# Patient Record
Sex: FEMALE | Race: WHITE | NOT HISPANIC OR LATINO | Employment: FULL TIME | URBAN - METROPOLITAN AREA
[De-identification: names, ages, dates, MRNs, and addresses within clinical notes are randomized per-mention and may not be internally consistent; named-entity substitution may affect disease eponyms.]

---

## 2017-03-20 ENCOUNTER — ALLSCRIPTS OFFICE VISIT (OUTPATIENT)
Dept: OTHER | Facility: OTHER | Age: 52
End: 2017-03-20

## 2017-03-20 DIAGNOSIS — F31.12 BIPOLAR DISORDER, CURRENT EPISODE MANIC WITHOUT PSYCHOTIC FEATURES, MODERATE (HCC): ICD-10-CM

## 2017-05-19 ENCOUNTER — ALLSCRIPTS OFFICE VISIT (OUTPATIENT)
Dept: OTHER | Facility: OTHER | Age: 52
End: 2017-05-19

## 2018-01-10 NOTE — PSYCH
Psych Med Mgmt    Appearance: was calm and cooperative, adequate hygiene and grooming and good eye contact  Observed mood: irritable  Observed mood: affect was constricted  Speech: a normal rate and fluent  Thought processes: coherent/organized  Hallucinations: no hallucinations present  Thought Content: no delusions  Abnormal Thoughts: The patient has no suicidal thoughts and no homicidal thoughts  Orientation: The patient is oriented to person, place and time, oriented to person, oriented to place and oriented to time  Recent and Remote Memory: short term memory intact and long term memory intact  Attention Span And Concentration: concentration intact  Insight: Limited insight  Judgment: Her judgment was limited  Muscle Strength And Tone  Muscle strength and tone were normal    The patient is experiencing no localized pain  Goals addressed in session: Medication Management       Treatment Recommendations: continue current treatment  Risks, Benefits And Possible Side Effects Of Medications: Risks, benefits, and possible side effects of medications explained to patient and patient verbalizes understanding  She reports normal appetite, normal energy level, no weight change and normal number of sleep hours  Patient stated Patience Baer helps her fall asleep but cannot stay asleep  She continue to take Seroquel 200 mg at bedtime but 30 tablets of 400 mg which only lasts her 2 months costs her 400 dollars  I switched the Rx to 400 mg twice daily to help with cost as it will last her 4 months until she meets her deductible  Patient stated she has been dx with Shannon  Vitals  Signs   Recorded: 35RXS4332 02:34PM   Height: 5 ft 10 in  Weight: 170 lb   BMI Calculated: 24 39  BSA Calculated: 1 95    Assessment    1  Bipolar I disorder, most recent episode manic, moderate (296 42) (F31 12)    Plan    1   Colcrys 0 6 MG Oral Tablet; TAKE 1 TABLET DAILY AS DIRECTED    2  QUEtiapine Fumarate 400 MG Oral Tablet; Take 1 tablet twice daily    Review of Systems    Constitutional: as noted in HPI  Substance Abuse Hx    Substance Abuse History: occasional ETOH  Active Problems    1  Acute gouty arthropathy (274 01) (M10 9)   2  Acute sinusitis (461 9) (J01 90)   3  Acute upper respiratory infection (465 9) (J06 9)   4  Acute upper respiratory infection (465 9) (J06 9)   5  ADHD (attention deficit hyperactivity disorder), combined type (314 01) (F90 2)   6  Allergic rhinitis (477 9) (J30 9)   7  Arthralgia of ankle or foot (719 47) (M25 579)   8  Backache (724 5) (M54 9)   9  Bipolar I disorder, most recent episode manic, moderate (296 42) (F31 12)   10  Bronchitis, asthmatic (493 90) (J45 909)   11  Chronic kidney disease (CKD), stage II (mild) (585 2) (N18 2)   12  Circadian rhythm sleep disorder, shift work type (327 36) (G47 26)   13  Cough (786 2) (R05)   14  Cough variant asthma (493 82) (J45 991)   15  Elbow injury (959 3) (S59 909A)   16  Lumbar disc disorder with myelopathy (722 73) (M51 06)   17  Paget's disease of bone (731 0) (M88 9)   18  Reactive airway disease (493 90) (J45 909)   19  Renal/ureteral disease (593 9) (N28 9)   20  Screening for cardiovascular condition (V81 2) (Z13 6)   21  Screening for diabetes mellitus (V77 1) (Z13 1)   22  Vaginal candidiasis (112 1) (B37 3)   23  Vitamin D deficiency (268 9) (E55 9)   24  Wheezing (786 07) (R06 2)    Past Medical History    1  History of Encounter for routine gynecological examination (V72 31) (Z01 419)   2  History of breast lump (V13 89) (Z87 898)   3  History of chronic kidney disease (V13 09) (Z87 448)   4  History of fatigue (V13 89) (Z87 898)   5  History of low back pain (V13 59) (Z87 39)   6  History of Meningitis (V12 42)   7  History of Other chronic pain (338 29) (G89 29)   8  History of Other chronic pain (338 29) (G89 29)   9   History of Screening for malignant neoplasm of cervix (V76 2) (Z12 4)    The active problems and past medical history were reviewed and updated today  Allergies    1  Penicillins   2  NSAIDs    Current Meds   1  QUEtiapine Fumarate 400 MG Oral Tablet; TAKE 1 TABLET AT BEDTIME  Requested for:   20Jun2016; Last Rx:20Jun2016 Ordered    The medication list was reviewed and updated today  Family Psych History  Family History    1  Family history of No Significant Family History    The family history was reviewed and updated today  Social History    · Denied: History of Alcohol Use (History)   · Never A Smoker   · Never Used Drugs  The social history was reviewed and updated today  The social history was reviewed and is unchanged  End of Encounter Meds    1  Colcrys 0 6 MG Oral Tablet; TAKE 1 TABLET DAILY AS DIRECTED; Therapy: 94UIK1349 to (Evaluate:09Nov2016); Last Rx:10Oct2016 Ordered    2  QUEtiapine Fumarate 400 MG Oral Tablet;  Take 1 tablet twice daily  Requested for:   44SFW3325; Last Rx:10Oct2016; Status: ACTIVE - Transmit to [facility]   Verification Ordered    Signatures   Electronically signed by : ANGELY Smith ; Oct 10 2016  2:35PM EST                       (Author)

## 2018-01-14 VITALS
SYSTOLIC BLOOD PRESSURE: 126 MMHG | DIASTOLIC BLOOD PRESSURE: 82 MMHG | HEART RATE: 76 BPM | WEIGHT: 158.38 LBS | BODY MASS INDEX: 22.67 KG/M2 | HEIGHT: 70 IN | TEMPERATURE: 98.7 F

## 2018-01-15 NOTE — PSYCH
Psych Med Mgmt    Appearance: was calm and cooperative, adequate hygiene and grooming and good eye contact  Observed mood: depressed, irritable and anxious  Observed mood: affect was constricted  Speech: a normal rate and fluent  Thought processes: coherent/organized  Hallucinations: no hallucinations present  Thought Content: no delusions  Abnormal Thoughts: The patient has no suicidal thoughts and no homicidal thoughts  Orientation: The patient is oriented to person, place and time, oriented to person, oriented to place and oriented to time  Recent and Remote Memory: short term memory intact and long term memory intact  Attention Span And Concentration: concentration impaired  Insight: Limited insight  Judgment: Her judgment was limited  Muscle Strength And Tone  Muscle strength and tone were normal    The patient is experiencing no localized pain  Goals addressed in session: Medication Management       Treatment Recommendations: Patient stated she is feeling irritable and has poor sleep she wants to try higher dose of Seroquel  Risks, Benefits And Possible Side Effects Of Medications: Risks, benefits, and possible side effects of medications explained to patient and patient verbalizes understanding  She reports normal appetite, normal energy level and decrease in number of sleep hours   due to irritability and poor sleep will increase dose of Seroquel  No health changes or new medications   No medication side effects  Vitals  Signs [Data Includes: Current Encounter]   Recorded: [id number] 03:54PM   Height: 5 ft 10 in  Weight: 172 lb   BMI Calculated: 24 68  BSA Calculated: 1 96    Assessment    1  ADHD (attention deficit hyperactivity disorder), combined type (314 01) (F90 2)   2  Bipolar I disorder, most recent episode manic, moderate (296 42) (F31 12)    Plan    1   QUEtiapine Fumarate 200 MG Oral Tablet; TAKE 1 TABLET AT BEDTIME    Review of Systems    Constitutional: No fever, no chills, feels well, no tiredness, no recent weight gain or loss  Cardiovascular: no complaints of slow or fast heart rate, no chest pain, no palpitations  Respiratory: no complaints of shortness of breath, no wheezing, no dyspnea on exertion  Gastrointestinal: no complaints of abdominal pain, no constipation, no nausea, no diarrhea, no vomiting  Genitourinary: no complaints of dysuria, no incontinence, no pelvic pain, no urinary frequency  Musculoskeletal: no complaints of arthralgia, no myalgias, no limb pain, no joint stiffness  Integumentary: no complaints of skin rash, no itching, no dry skin  Neurological: no complaints of headache, no confusion, no numbness, no dizziness  Substance Abuse Hx    Substance Abuse History: Denies  Active Problems    1  Acute gouty arthropathy (274 01) (M10 00)   2  Acute sinusitis (461 9) (J01 90)   3  Acute upper respiratory infection (465 9) (J06 9)   4  Acute upper respiratory infection (465 9) (J06 9)   5  ADHD (attention deficit hyperactivity disorder), combined type (314 01) (F90 2)   6  Allergic rhinitis (477 9) (J30 9)   7  Arthralgia of ankle or foot (719 47) (M25 579)   8  Backache (724 5) (M54 9)   9  Bipolar I disorder, most recent episode manic, moderate (296 42) (F31 12)   10  Bronchitis, asthmatic (493 90) (J45 909)   11  Chronic kidney disease (CKD), stage II (mild) (585 2) (N18 2)   12  Circadian rhythm sleep disorder, shift work type (327 36) (G47 26)   13  Cough (786 2) (R05)   14  Cough variant asthma (493 82) (J45 991)   15  Elbow injury (959 3) (S59 909A)   16  Lumbar disc disorder with myelopathy (722 73) (M51 06)   17  Paget's disease of bone (731 0) (M88 9)   18  Reactive airway disease (493 90) (J45 909)   19  Renal/ureteral disease (593 9) (N28 9)   20  Screening for cardiovascular condition (V81 2) (Z13 6)   21  Screening for diabetes mellitus (V77 1) (Z13 1)   22   Vaginal candidiasis (112  1) (B37 3)   23  Vitamin D deficiency (268 9) (E55 9)   24  Wheezing (786 07) (R06 2)    Past Medical History    1  History of Encounter for routine gynecological examination (V72 31) (Z01 419)   2  History of breast lump (V13 89) (Z87 898)   3  History of chronic kidney disease (V13 09) (Z87 448)   4  History of fatigue (V13 89) (Z87 898)   5  History of low back pain (V13 59) (Z87 39)   6  History of Meningitis (V12 42)   7  History of Other chronic pain (338 29) (G89 29)   8  History of Other chronic pain (338 29) (G89 29)   9  History of Screening for malignant neoplasm of cervix (V76 2) (Z12 4)    The active problems and past medical history were reviewed and updated today  Allergies    1  Penicillins   2  NSAIDs    Current Meds   1  QUEtiapine Fumarate 300 MG Oral Tablet; TAKE 0 5 TABLET Once At Bedtime    Requested for: 15Oct2015; Last Rx:15Oct2015 Ordered    The medication list was reviewed and updated today  Family Psych History    1  Family history of No Significant Family History    The family history was reviewed and updated today  Social History    · Denied: History of Alcohol Use (History)   · Never A Smoker   · Never Used Drugs  The social history was reviewed and updated today  The social history was reviewed and is unchanged  End of Encounter Meds    1   QUEtiapine Fumarate 200 MG Oral Tablet; TAKE 1 TABLET AT BEDTIME  Requested for:   28XMX9881; Last Rx:70Lgp1076 Ordered    Signatures   Electronically signed by : ANGELY Kelly ; Feb 9 2016  3:58PM EST                       (Author)

## 2018-01-16 NOTE — PSYCH
Psych Med Mgmt    Appearance: was calm and cooperative and good eye contact  Observed mood: mood appropriate  Observed mood: affect appropriate  Speech: a normal rate and fluent  Thought processes: coherent/organized  Hallucinations: no hallucinations present  Thought Content: no delusions  Abnormal Thoughts: The patient has no suicidal thoughts and no homicidal thoughts  Orientation: The patient is oriented to person, place and time, oriented to person, oriented to place and oriented to time  Recent and Remote Memory: short term memory intact and long term memory intact  Attention Span And Concentration: concentration impaired  Insight: Limited insight  Judgment: Her judgment was limited  Muscle Strength And Tone  Muscle strength and tone were normal         Goals addressed in session: Medication Management       Treatment Recommendations: Increase Seroquel to 400 mg for insomnia  Trial Belsomra 20 mg 1/2 tab to 1 tab po qhs  Risks, Benefits And Possible Side Effects Of Medications: Risks, benefits, and possible side effects of medications explained to patient and patient verbalizes understanding  She reports normal appetite, normal energy level, no weight change and normal number of sleep hours  Patient continues to have irritability and difficulties falling asleep   Sleep consultation referral offered but patient declined  Assessment    1  Bipolar I disorder, most recent episode manic, moderate (296 42) (F31 12)    Plan    1  QUEtiapine Fumarate 400 MG Oral Tablet; TAKE 1 TABLET AT BEDTIME    2  Belsomra 20 MG Oral Tablet; TAKE 1 TABLET Bedtime PRN insomnia    Review of Systems    Constitutional: as noted in HPI  Substance Abuse Hx    Substance Abuse History: Denies  Active Problems    1  Acute gouty arthropathy (274 01) (M10 00)   2  Acute sinusitis (461 9) (J01 90)   3  Acute upper respiratory infection (465 9) (J06 9)   4   Acute upper respiratory infection (465 9) (J06 9)   5  ADHD (attention deficit hyperactivity disorder), combined type (314 01) (F90 2)   6  Allergic rhinitis (477 9) (J30 9)   7  Arthralgia of ankle or foot (719 47) (M25 579)   8  Backache (724 5) (M54 9)   9  Bipolar I disorder, most recent episode manic, moderate (296 42) (F31 12)   10  Bronchitis, asthmatic (493 90) (J45 909)   11  Chronic kidney disease (CKD), stage II (mild) (585 2) (N18 2)   12  Circadian rhythm sleep disorder, shift work type (327 36) (G47 26)   13  Cough (786 2) (R05)   14  Cough variant asthma (493 82) (J45 991)   15  Elbow injury (959 3) (S59 909A)   16  Lumbar disc disorder with myelopathy (722 73) (M51 06)   17  Paget's disease of bone (731 0) (M88 9)   18  Reactive airway disease (493 90) (J45 909)   19  Renal/ureteral disease (593 9) (N28 9)   20  Screening for cardiovascular condition (V81 2) (Z13 6)   21  Screening for diabetes mellitus (V77 1) (Z13 1)   22  Vaginal candidiasis (112 1) (B37 3)   23  Vitamin D deficiency (268 9) (E55 9)   24  Wheezing (786 07) (R06 2)    Past Medical History    1  History of Encounter for routine gynecological examination (V72 31) (Z01 419)   2  History of breast lump (V13 89) (Z87 898)   3  History of chronic kidney disease (V13 09) (Z87 448)   4  History of fatigue (V13 89) (Z87 898)   5  History of low back pain (V13 59) (Z87 39)   6  History of Meningitis (V12 42)   7  History of Other chronic pain (338 29) (G89 29)   8  History of Other chronic pain (338 29) (G89 29)   9  History of Screening for malignant neoplasm of cervix (V76 2) (Z12 4)    The active problems and past medical history were reviewed and updated today  Allergies    1  Penicillins   2  NSAIDs    Current Meds   1  QUEtiapine Fumarate 200 MG Oral Tablet; TAKE 1 TABLET AT BEDTIME  Requested for:   80LUL0606; Last Rx:10Nvf7695 Ordered    The medication list was reviewed and updated today  Family Psych History  Family History    1   Family history of No Significant Family History    The family history was reviewed and updated today  Social History    · Denied: History of Alcohol Use (History)   · Never A Smoker   · Never Used Drugs  The social history was reviewed and updated today  End of Encounter Meds    1  QUEtiapine Fumarate 400 MG Oral Tablet; TAKE 1 TABLET AT BEDTIME  Requested for:   20Jun2016; Last Rx:20Jun2016 Ordered    2  Belsomra 20 MG Oral Tablet; TAKE 1 TABLET Bedtime PRN insomnia; Therapy: 20Jun2016 to (Evaluate:30Jun2016);  Last Rx:20Jun2016 Ordered    Signatures   Electronically signed by : ANGELY Venegas ; Jun 20 2016  2:58PM EST                       (Author)

## 2018-01-17 NOTE — PSYCH
Psych Med Mgmt    Appearance: was calm and cooperative, adequate hygiene and grooming and good eye contact  Observed mood: mood appropriate  Observed mood: affect appropriate  Speech: a normal rate and fluent  Thought processes: coherent/organized  Hallucinations: no hallucinations present  Thought Content: no delusions  Abnormal Thoughts: The patient has no suicidal thoughts and no homicidal thoughts  Orientation: The patient is oriented to person, place and time, oriented to person, oriented to place and oriented to time  Recent and Remote Memory: short term memory intact and long term memory intact  Attention Span And Concentration: concentration intact  Insight: Limited insight  Judgment: Her judgment was limited  Muscle Strength And Tone  Muscle strength and tone were normal       Goals addressed in session: Medication Management     Treatment Recommendations: Continue Seroquel 200 mg qhs   Patient stated insurance covers now  Risks, Benefits And Possible Side Effects Of Medications: Risks, benefits, and possible side effects of medications explained to patient and patient verbalizes understanding  She reports normal appetite, normal energy level, no weight change and normal number of sleep hours  Mood has been stable   No medication side effects  No recent health changes  Dog bite tx at urgent ctr with IM Tramadol may have cause creatinine elevation 2 0  Repeat CMP  Assessment    1  Bipolar I disorder, most recent episode manic, moderate (296 42) (F31 12)    Plan    1  QUEtiapine Fumarate 200 MG Oral Tablet; TAKE 1 TABLET AT BEDTIME   2  (1) COMPREHENSIVE METABOLIC PANEL; Status:Active; Requested for:20Mar2017;     Review of Systems    Constitutional: as noted in HPI  Substance Abuse Hx    Substance Abuse History: Deneis  Active Problems    1  Acute gouty arthropathy (274 01) (M10 9)   2  Acute sinusitis (461 9) (J01 90)   3   ADHD (attention deficit hyperactivity disorder), combined type (314 01) (F90 2)   4  Allergic rhinitis (477 9) (J30 9)   5  Backache (724 5) (M54 9)   6  Bipolar I disorder, most recent episode manic, moderate (296 42) (F31 12)   7  Chronic gout of foot due to renal impairment, unspecified laterality (274 19) (M1A 3790)   8  Chronic kidney disease (CKD), stage II (mild) (585 2) (N18 2)   9  Circadian rhythm sleep disorder, shift work type (327 36) (G47 26)   10  Cough variant asthma (493 82) (J45 991)   11  Elbow injury (959 3) (S59 909A)   12  Gout (274 9) (M10 9)   13  Lumbar disc disorder with myelopathy (722 73) (M51 06)   14  Paget's disease of bone (731 0) (M88 9)   15  Renal/ureteral disease (593 9) (N28 9)   16  Screening for cardiovascular condition (V81 2) (Z13 6)   17  Vaginal candidiasis (112 1) (B37 3)   18  Vitamin D deficiency (268 9) (E55 9)    Past Medical History    1  History of Encounter for routine gynecological examination (V72 31) (Z01 419)   2  History of breast lump (V13 89) (Z87 898)   3  History of chronic kidney disease (V13 09) (Z87 448)   4  History of fatigue (V13 89) (Z87 898)   5  History of low back pain (V13 59) (Z87 39)   6  History of Meningitis (V12 42)   7  History of Screening for malignant neoplasm of cervix (V76 2) (Z12 4)    The active problems and past medical history were reviewed and updated today  Allergies    1  Penicillins   2  NSAIDs    Current Meds   1  Allopurinol 100 MG Oral Tablet; Take 1 tablet daily; Therapy: 13BFR1578 to (Evaluate:45Bwh4834)  Requested for: 27JKB5646; Last   Rx:55Irv4882 Ordered   2  Colcrys 0 6 MG Oral Tablet; TAKE 1 TABLET DAILY AS DIRECTED; Therapy: 80FGX7489 to (Evaluate:07Bes7834)  Requested for: 67AYR1825; Last   Rx:11Jan2017 Ordered   3  QUEtiapine Fumarate 400 MG Oral Tablet; Take 1 tablet twice daily;    Therapy: 17LBP7245 to (Wallace Serve)  Requested for: 21WKM3467; Last   Rx:17Mar2017 Ordered    The medication list was reviewed and updated today  Family Psych History  Mother    1  Family history of No Significant Family History  Father    2  Family history of No Significant Family History  Family History    3  Family history of No Significant Family History    The family history was reviewed and updated today  Social History    · Denied: History of Alcohol Use (History)   · Never A Smoker   · Never Used Drugs  The social history was reviewed and updated today  The social history was reviewed and is unchanged  End of Encounter Meds    1  Colcrys 0 6 MG Oral Tablet (Colchicine); TAKE 1 TABLET DAILY AS DIRECTED; Therapy: 56RLH0441 to (Evaluate:53Ljm1097)  Requested for: 47NGS3484; Last   Rx:11Jan2017 Ordered    2  QUEtiapine Fumarate 200 MG Oral Tablet; TAKE 1 TABLET AT BEDTIME; Therapy: 31AUB6217 to (Evaluate:32Eqg4735)  Requested for: 20Mar2017; Last   Rx:20Mar2017 Ordered    3  Allopurinol 100 MG Oral Tablet; Take 1 tablet daily;    Therapy: 70UEK9443 to (Evaluate:80Giz6820)  Requested for: 87RXX8669; Last   Rx:04Dsd6701 Ordered    Signatures   Electronically signed by : ANGELY Cortez ; Mar 20 2017  2:37PM EST                       (Author)

## 2018-04-26 ENCOUNTER — DOCUMENTATION (OUTPATIENT)
Dept: PSYCHIATRY | Facility: CLINIC | Age: 53
End: 2018-04-26

## 2018-04-26 NOTE — PROGRESS NOTES
Treatment Plan not completed within required time limits due to: no show appointment on 4/23/2018    Last seen 3/20/17 and was Bumped on 9/19/17

## 2019-01-02 ENCOUNTER — OFFICE VISIT (OUTPATIENT)
Dept: INTERNAL MEDICINE CLINIC | Age: 54
End: 2019-01-02
Payer: COMMERCIAL

## 2019-01-02 VITALS
SYSTOLIC BLOOD PRESSURE: 110 MMHG | HEART RATE: 64 BPM | TEMPERATURE: 98.2 F | DIASTOLIC BLOOD PRESSURE: 70 MMHG | OXYGEN SATURATION: 99 % | WEIGHT: 160.4 LBS | HEIGHT: 70 IN | BODY MASS INDEX: 22.96 KG/M2

## 2019-01-02 DIAGNOSIS — F31.12 BIPOLAR I DISORDER, MOST RECENT EPISODE MANIC, MODERATE (HCC): ICD-10-CM

## 2019-01-02 DIAGNOSIS — Z12.31 SCREENING MAMMOGRAM, ENCOUNTER FOR: ICD-10-CM

## 2019-01-02 DIAGNOSIS — R59.1 LYMPHADENOPATHY: ICD-10-CM

## 2019-01-02 DIAGNOSIS — Z13.220 SCREENING, LIPID: ICD-10-CM

## 2019-01-02 DIAGNOSIS — D24.2 BREAST ADENOMA, LEFT: Primary | ICD-10-CM

## 2019-01-02 DIAGNOSIS — N18.30 CHRONIC RENAL DISEASE, STAGE 3, MODERATELY DECREASED GLOMERULAR FILTRATION RATE (GFR) BETWEEN 30-59 ML/MIN/1.73 SQUARE METER (HCC): ICD-10-CM

## 2019-01-02 PROCEDURE — 3008F BODY MASS INDEX DOCD: CPT | Performed by: INTERNAL MEDICINE

## 2019-01-02 PROCEDURE — 99214 OFFICE O/P EST MOD 30 MIN: CPT | Performed by: INTERNAL MEDICINE

## 2019-01-02 RX ORDER — QUETIAPINE FUMARATE 100 MG/1
100 TABLET, FILM COATED ORAL
Qty: 30 TABLET | Refills: 6 | Status: SHIPPED | OUTPATIENT
Start: 2019-01-02

## 2019-01-02 NOTE — ASSESSMENT & PLAN NOTE
She does have a long history of bipolar disorder and is on Seroquel 100 H else chronically    She currently does not have a psychiatrist so will reorder

## 2019-01-02 NOTE — PROGRESS NOTES
Assessment/Plan:    Breast adenoma, left  Patient is actually here for a with mammogram   She has a known on fibroadenoma has been followed for years but is due  It does get tender at times  She is postmenopausal    Lymphadenopathy  Family members feel she has a goiter however exam only shows mild lymphadenopathy submandibular and a prominent Fernando's apple  Will check an ultrasound of her neck    Chronic renal disease, stage 3, moderately decreased glomerular filtration rate (GFR) between 30-59 mL/min/1 73 square meter ([address] )  Patient has chronic renal disease for years due to lithium use in the past   Her last creatinine was 2 0 in 2016  She needs to have it recheck    Bipolar I disorder, most recent episode manic, moderate ([address] )  She does have a long history of bipolar disorder and is on Seroquel 100 H else chronically  She currently does not have a psychiatrist so will reorder       Diagnoses and all orders for this visit:    Breast adenoma, left  -     Mammo diagnostic bilateral w cad; Future    Lymphadenopathy  -     Comprehensive metabolic panel; Future  -     CBC and differential; Future  -     TSH baseline; Future    Screening mammogram, encounter for    Screening, lipid  -     Lipid panel; Future    Chronic renal disease, stage 3, moderately decreased glomerular filtration rate (GFR) between 30-59 mL/min/1 73 square meter (HCC)    Bipolar I disorder, most recent episode manic, moderate (HCC)  -     QUEtiapine (SEROquel) 100 mg tablet; Take 1 tablet (100 mg total) by mouth daily at bedtime          Subjective:      Patient ID: Mekhi Hickman is a 52 y o  female  Patient feels well but needs a checkup in a repeat mammogram             Review of Systems   Constitutional: Negative for chills, fatigue, fever and unexpected weight change  HENT: Negative for congestion, ear pain, hearing loss, postnasal drip, sinus pressure, sore throat, trouble swallowing and voice change      Eyes: Negative for visual disturbance  Respiratory: Negative for cough, chest tightness, shortness of breath and wheezing  Cardiovascular: Negative for chest pain, palpitations and leg swelling  Gastrointestinal: Negative for abdominal distention, abdominal pain, anal bleeding, blood in stool, constipation, diarrhea and nausea  Endocrine: Negative for cold intolerance, polydipsia, polyphagia and polyuria  Genitourinary: Negative for dysuria, flank pain, frequency, hematuria and urgency  Longstanding left breast lump   Musculoskeletal: Negative for arthralgias, back pain, gait problem, joint swelling, myalgias and neck pain  Skin: Negative for rash  Allergic/Immunologic: Negative for immunocompromised state  Neurological: Negative for dizziness, syncope, facial asymmetry, weakness, light-headedness, numbness and headaches  Hematological: Negative for adenopathy  Psychiatric/Behavioral: Negative for confusion, sleep disturbance and suicidal ideas  Objective:      /70 (BP Location: Left arm, Patient Position: Sitting)   Pulse 64   Temp 98 2 °F (36 8 °C) (Tympanic)   Ht 5' 10" (1 778 m)   Wt 72 8 kg (160 lb 6 4 oz)   SpO2 99%   BMI 23 02 kg/m²          Physical Exam   Constitutional: She is oriented to person, place, and time  She appears well-developed and well-nourished  No distress  HENT:   Right Ear: External ear normal    Left Ear: External ear normal    Nose: Nose normal    Mouth/Throat: Oropharynx is clear and moist  No oropharyngeal exudate  Eyes: Pupils are equal, round, and reactive to light  EOM are normal    Neck: Normal range of motion  Neck supple  No JVD present  No thyromegaly present  Prominent Fernando's apple   Cardiovascular: Normal rate, regular rhythm, normal heart sounds and intact distal pulses  Exam reveals no gallop  No murmur heard  Pulmonary/Chest: Effort normal and breath sounds normal  No respiratory distress  She has no wheezes  She has no rales     Abdominal: Soft  Bowel sounds are normal  She exhibits no distension and no mass  There is no tenderness  Genitourinary:   Genitourinary Comments: As a small fibroadenoma left breast right upper quadrant   Musculoskeletal: Normal range of motion  She exhibits no tenderness  Lymphadenopathy:     She has cervical adenopathy (Small submandibular lymph nodes)  Neurological: She is alert and oriented to person, place, and time  No cranial nerve deficit  Coordination normal    Skin: No rash noted  Psychiatric: She has a normal mood and affect  Her behavior is normal  Judgment and thought content normal    Vitals reviewed

## 2019-01-02 NOTE — ASSESSMENT & PLAN NOTE
Family members feel she has a goiter however exam only shows mild lymphadenopathy submandibular and a prominent Fernando's apple    Will check an ultrasound of her neck

## 2019-01-02 NOTE — PATIENT INSTRUCTIONS
Fibrocystic Breast Changes   AMBULATORY CARE:   Fibrocystic changes  are changes in your breast tissue  Your breast tissue may have small cysts, benign lumps (not cancer), or thickened areas  These breast tissue changes are common in women who have not gone through menopause  Fibrocystic breast changes do not increase your risk of breast cancer  The cause of fibrocystic breast changes is unknown  They may be related to hormonal changes that occur during your menstrual cycle  Common signs and symptoms of fibrocystic breast changes:  Signs and symptoms may be more noticeable before your period  You may have any of the following:  · Tenderness and pain in the upper outer areas of both breasts    · Cysts that get larger and more painful before your period    · Breast swelling during your period    · Clear or cloudy nipple discharge  Contact your healthcare provider if:   · You notice other changes in your breasts or nipples, such as dimpling or a rash  · You have bloody nipple discharge  · You have a fever  · You have questions or concerns about your condition or care  Treatment:  Your healthcare provider may recommend the following to relieve your symptoms:  · NSAIDs , such as ibuprofen, help decrease swelling, pain, and fever  This medicine is available with or without a doctor's order  NSAIDs can cause stomach bleeding or kidney problems in certain people  If you take blood thinner medicine, always ask your healthcare provider if NSAIDs are safe for you  Always read the medicine label and follow directions  · Oral contraceptives  (birth control pills) may be recommended by your healthcare provider  These may help to decrease the level of hormones that worsen your signs and symptoms  · Surgery  to remove a large lump or cysts that return after drainage may be done  Manage your symptoms:   · Apply heat  on your breasts for 20 to 30 minutes every 2 hours for as many days as directed   Heat helps decrease pain and muscle spasms  · Apply ice  on your breasts for 15 to 20 minutes every hour or as directed  Use an ice pack, or put crushed ice in a plastic bag  Cover it with a towel  Ice helps prevent tissue damage and decreases swelling and pain  · Wear a well-fitted, supportive bra  It may help to relieve pain and swelling  · Avoid or limit caffeine  This may help to decrease symptoms in some women  Caffeine is found in coffee, tea, sodas, and chocolate  Continue breast self-exams:  Check your breast for changes in size, shape, or feel of the breast tissue  Check under your arms and all around your breasts  If you have monthly periods, examine your breasts after your period is over  Contact your healthcare provider if you notice any changes in your breasts  If you have questions, ask for more information about how to do a breast self-exam         Follow up with your healthcare provider as directed:  Write down your questions so you remember to ask them during your visits  © 2017 [facility] Information is for End User's use only and may not be sold, redistributed or otherwise used for commercial purposes  All illustrations and images included in CareNotes® are the copyrighted property of [employer]  or Landon Alv[employer]  The above information is an  only  It is not intended as medical advice for individual conditions or treatments  Talk to your doctor, nurse or pharmacist before following any medical regimen to see if it is safe and effective for you

## 2019-01-02 NOTE — ASSESSMENT & PLAN NOTE
Patient is actually here for a with mammogram   She has a known on fibroadenoma has been followed for years but is due  It does get tender at times    She is postmenopausal

## 2019-01-02 NOTE — ASSESSMENT & PLAN NOTE
Patient has chronic renal disease for years due to lithium use in the past   Her last creatinine was 2 0 in 2016    She needs to have it recheck

## 2019-01-05 LAB
ALBUMIN SERPL-MCNC: 4.4 G/DL (ref 3.6–5.1)
ALBUMIN/GLOB SERPL: 1.9 (CALC) (ref 1–2.5)
ALP SERPL-CCNC: 96 U/L (ref 33–130)
ALT SERPL-CCNC: 13 U/L (ref 6–29)
AST SERPL-CCNC: 16 U/L (ref 10–35)
BASOPHILS # BLD AUTO: 70 CELLS/UL (ref 0–200)
BASOPHILS NFR BLD AUTO: 1.4 %
BILIRUB SERPL-MCNC: 0.6 MG/DL (ref 0.2–1.2)
BUN SERPL-MCNC: 24 MG/DL (ref 7–25)
BUN/CREAT SERPL: 13 (CALC) (ref 6–22)
CALCIUM SERPL-MCNC: 10.3 MG/DL (ref 8.6–10.4)
CHLORIDE SERPL-SCNC: 113 MMOL/L (ref 98–110)
CHOLEST SERPL-MCNC: 231 MG/DL
CHOLEST/HDLC SERPL: 3.5 (CALC)
CO2 SERPL-SCNC: 30 MMOL/L (ref 20–32)
CREAT SERPL-MCNC: 1.79 MG/DL (ref 0.5–1.05)
EOSINOPHIL # BLD AUTO: 340 CELLS/UL (ref 15–500)
EOSINOPHIL NFR BLD AUTO: 6.8 %
ERYTHROCYTE [DISTWIDTH] IN BLOOD BY AUTOMATED COUNT: 11.8 % (ref 11–15)
GLOBULIN SER CALC-MCNC: 2.3 G/DL (CALC) (ref 1.9–3.7)
GLUCOSE SERPL-MCNC: 95 MG/DL (ref 65–99)
HCT VFR BLD AUTO: 40.9 % (ref 35–45)
HDLC SERPL-MCNC: 66 MG/DL
HGB BLD-MCNC: 14.2 G/DL (ref 11.7–15.5)
LDLC SERPL CALC-MCNC: 141 MG/DL (CALC)
LYMPHOCYTES # BLD AUTO: 1685 CELLS/UL (ref 850–3900)
LYMPHOCYTES NFR BLD AUTO: 33.7 %
MCH RBC QN AUTO: 32.3 PG (ref 27–33)
MCHC RBC AUTO-ENTMCNC: 34.7 G/DL (ref 32–36)
MCV RBC AUTO: 93 FL (ref 80–100)
MONOCYTES # BLD AUTO: 400 CELLS/UL (ref 200–950)
MONOCYTES NFR BLD AUTO: 8 %
NEUTROPHILS # BLD AUTO: 2505 CELLS/UL (ref 1500–7800)
NEUTROPHILS NFR BLD AUTO: 50.1 %
NONHDLC SERPL-MCNC: 165 MG/DL (CALC)
PLATELET # BLD AUTO: 241 THOUSAND/UL (ref 140–400)
PMV BLD REES-ECKER: 9.8 FL (ref 7.5–12.5)
POTASSIUM SERPL-SCNC: 4.2 MMOL/L (ref 3.5–5.3)
PROT SERPL-MCNC: 6.7 G/DL (ref 6.1–8.1)
RBC # BLD AUTO: 4.4 MILLION/UL (ref 3.8–5.1)
SL AMB EGFR AFRICAN AMERICAN: 36 ML/MIN/1.73M2
SL AMB EGFR NON AFRICAN AMERICAN: 31 ML/MIN/1.73M2
SODIUM SERPL-SCNC: 147 MMOL/L (ref 135–146)
TRIGL SERPL-MCNC: 121 MG/DL
TSH SERPL-ACNC: 2.09 MIU/L
WBC # BLD AUTO: 5 THOUSAND/UL (ref 3.8–10.8)

## 2019-01-08 ENCOUNTER — HOSPITAL ENCOUNTER (OUTPATIENT)
Dept: MAMMOGRAPHY | Facility: CLINIC | Age: 54
Discharge: HOME | End: 2019-01-08

## 2019-01-11 ENCOUNTER — HOSPITAL ENCOUNTER (OUTPATIENT)
Dept: ULTRASOUND IMAGING | Facility: CLINIC | Age: 54
Discharge: HOME | End: 2019-01-11
Payer: COMMERCIAL

## 2019-01-11 ENCOUNTER — HOSPITAL ENCOUNTER (OUTPATIENT)
Dept: MAMMOGRAPHY | Facility: CLINIC | Age: 54
Discharge: HOME | End: 2019-01-11
Payer: COMMERCIAL

## 2019-01-11 VITALS — HEIGHT: 70 IN | BODY MASS INDEX: 22.9 KG/M2 | WEIGHT: 160 LBS

## 2019-01-11 DIAGNOSIS — N63.0 LUMP OF BREAST: ICD-10-CM

## 2019-01-11 DIAGNOSIS — D24.2 BREAST ADENOMA, LEFT: ICD-10-CM

## 2019-01-11 PROCEDURE — G0279 TOMOSYNTHESIS, MAMMO: HCPCS

## 2019-01-11 PROCEDURE — 76642 ULTRASOUND BREAST LIMITED: CPT

## 2019-01-11 PROCEDURE — 77066 DX MAMMO INCL CAD BI: CPT

## 2019-02-13 ENCOUNTER — RX ONLY (RX ONLY)
Age: 54
End: 2019-02-13

## 2019-02-13 ENCOUNTER — DOCTOR'S OFFICE (OUTPATIENT)
Dept: URBAN - METROPOLITAN AREA CLINIC 137 | Facility: CLINIC | Age: 54
Setting detail: OPHTHALMOLOGY
End: 2019-02-13
Payer: COMMERCIAL

## 2019-02-13 DIAGNOSIS — H52.4: ICD-10-CM

## 2019-02-13 PROCEDURE — 92015 DETERMINE REFRACTIVE STATE: CPT | Performed by: OPTOMETRIST

## 2019-02-13 PROCEDURE — 92004 COMPRE OPH EXAM NEW PT 1/>: CPT | Performed by: OPTOMETRIST

## 2019-02-13 ASSESSMENT — REFRACTION_CURRENTRX
OD_OVR_VA: 20/
OS_OVR_VA: 20/
OD_OVR_VA: 20/
OD_OVR_VA: 20/

## 2019-02-13 ASSESSMENT — REFRACTION_AUTOREFRACTION
OS_AXIS: 107
OS_SPHERE: -0.25
OD_CYLINDER: -0.25
OS_CYLINDER: -0.75
OD_AXIS: 29
OD_SPHERE: +0.75

## 2019-02-13 ASSESSMENT — REFRACTION_MANIFEST
OS_ADD: +2.00
OU_VA: 20/
OD_VA3: 20/
OS_AXIS: 105
OS_VA3: 20/
OD_SPHERE: +0.50
OD_VA1: 20/
OS_SPHERE: -0.25
OS_VA1: 20/20
OD_VA2: 20/
OS_VA2: 20/
OU_VA: 20/20
OD_VA2: 20/
OS_CYLINDER: -0.50
OS_VA3: 20/
OS_VA2: 20/
OD_VA1: 20/20
OD_VA3: 20/
OD_CYLINDER: SPH
OD_ADD: +2.00
OS_VA1: 20/

## 2019-02-13 ASSESSMENT — CONFRONTATIONAL VISUAL FIELD TEST (CVF)
OD_FINDINGS: FULL
OS_FINDINGS: FULL

## 2019-02-13 ASSESSMENT — SPHEQUIV_DERIVED
OS_SPHEQUIV: -0.5
OS_SPHEQUIV: -0.625
OD_SPHEQUIV: 0.625

## 2019-02-13 ASSESSMENT — VISUAL ACUITY
OD_BCVA: 20/25
OS_BCVA: 20/25

## 2020-02-18 ENCOUNTER — DOCTOR'S OFFICE (OUTPATIENT)
Dept: URBAN - METROPOLITAN AREA CLINIC 137 | Facility: CLINIC | Age: 55
Setting detail: OPHTHALMOLOGY
End: 2020-02-18

## 2020-02-18 DIAGNOSIS — H52.4: ICD-10-CM

## 2020-02-18 PROCEDURE — SELFPAYVIS VISION VISIT SELF PAY: Performed by: OPTOMETRIST

## 2020-02-18 ASSESSMENT — REFRACTION_MANIFEST
OS_ADD: +2.00
OS_ADD: +2.00
OS_AXIS: 105
OS_SPHERE: +0.25
OD_SPHERE: +0.50
OD_AXIS: 10
OD_VA1: 20/20
OD_ADD: +2.00
OS_VA1: 20/20
OS_AXIS: 155
OD_CYLINDER: SPH
OD_SPHERE: +1.00
OD_CYLINDER: -0.75
OD_ADD: +2.00
OU_VA: 20/20
OS_SPHERE: -0.25
OS_CYLINDER: -0.75
OS_VA1: 20/20
OS_CYLINDER: -0.50
OD_VA1: 20/20

## 2020-02-18 ASSESSMENT — KERATOMETRY
OD_AXISANGLE_DEGREES: 174
OD_K1POWER_DIOPTERS: 41.00
OS_AXISANGLE_DEGREES: 167
OD_K2POWER_DIOPTERS: 43.00
OS_K1POWER_DIOPTERS: 41.50
OS_K2POWER_DIOPTERS: 42.50

## 2020-02-18 ASSESSMENT — REFRACTION_AUTOREFRACTION
OS_AXIS: 145
OS_SPHERE: PLANO
OD_SPHERE: +1.00
OD_CYLINDER: -0.50
OS_CYLINDER: -0.50
OD_AXIS: 003

## 2020-02-18 ASSESSMENT — VISUAL ACUITY
OS_BCVA: 20/20-2
OD_BCVA: 20/20-2

## 2020-02-18 ASSESSMENT — CONFRONTATIONAL VISUAL FIELD TEST (CVF)
OD_FINDINGS: FULL
OS_FINDINGS: FULL

## 2020-02-18 ASSESSMENT — SPHEQUIV_DERIVED
OS_SPHEQUIV: -0.125
OS_SPHEQUIV: -0.5
OD_SPHEQUIV: 0.625
OD_SPHEQUIV: 0.75

## 2020-02-18 ASSESSMENT — AXIALLENGTH_DERIVED
OD_AL: 23.9034
OS_AL: 24.362
OS_AL: 24.2072
OD_AL: 23.8535

## 2021-01-08 ENCOUNTER — IMMUNIZATIONS (OUTPATIENT)
Dept: FAMILY MEDICINE CLINIC | Facility: HOSPITAL | Age: 56
End: 2021-01-08

## 2021-01-08 DIAGNOSIS — Z23 ENCOUNTER FOR IMMUNIZATION: ICD-10-CM

## 2021-01-08 PROCEDURE — 91300 SARS-COV-2 / COVID-19 MRNA VACCINE (PFIZER-BIONTECH) 30 MCG: CPT

## 2021-01-08 PROCEDURE — 0001A SARS-COV-2 / COVID-19 MRNA VACCINE (PFIZER-BIONTECH) 30 MCG: CPT

## 2021-01-27 ENCOUNTER — IMMUNIZATIONS (OUTPATIENT)
Dept: FAMILY MEDICINE CLINIC | Facility: HOSPITAL | Age: 56
End: 2021-01-27

## 2021-01-27 DIAGNOSIS — Z23 ENCOUNTER FOR IMMUNIZATION: Primary | ICD-10-CM

## 2021-01-27 PROCEDURE — 91300 SARS-COV-2 / COVID-19 MRNA VACCINE (PFIZER-BIONTECH) 30 MCG: CPT

## 2021-01-27 PROCEDURE — 0002A SARS-COV-2 / COVID-19 MRNA VACCINE (PFIZER-BIONTECH) 30 MCG: CPT

## 2021-03-24 ENCOUNTER — TELEPHONE (OUTPATIENT)
Dept: INTERNAL MEDICINE CLINIC | Age: 56
End: 2021-03-24

## 2022-06-29 ENCOUNTER — TELEPHONE (OUTPATIENT)
Dept: OTHER | Facility: OTHER | Age: 57
End: 2022-06-29

## 2022-06-29 NOTE — TELEPHONE ENCOUNTER
Patient was called and confirmed rescheduled appointment for 8/15/22 in [facility]

## 2024-05-31 ENCOUNTER — OFFICE VISIT (OUTPATIENT)
Dept: OBGYN CLINIC | Facility: CLINIC | Age: 59
End: 2024-05-31
Payer: COMMERCIAL

## 2024-05-31 VITALS
BODY MASS INDEX: 24.28 KG/M2 | SYSTOLIC BLOOD PRESSURE: 116 MMHG | DIASTOLIC BLOOD PRESSURE: 62 MMHG | WEIGHT: 169.6 LBS | HEIGHT: 70 IN

## 2024-05-31 DIAGNOSIS — N90.7 VULVAR CYST: Primary | ICD-10-CM

## 2024-05-31 PROCEDURE — 10060 I&D ABSCESS SIMPLE/SINGLE: CPT | Performed by: PHYSICIAN ASSISTANT

## 2024-05-31 NOTE — PROGRESS NOTES
Assessment/Plan:      Diagnoses and all orders for this visit:    Vulvar cyst     Pleasant 58-year-old female new patient presenting today [facility] Bartholin [facility].  She has been on about 3 days of doxycycline prescribed at the urgent care and performing warm compresses with minimal improvement.  Obvious cyst but is not a Bartholin cyst based on location, it is very external on the labia majora.  Continued observation with warm compresses and doxycycline discussed versus incision and drainage reviewed with patient.  Patient desires I&D which was performed today with procedure as documented below.  No evidence of abscess or pus from the cyst.  Moderate amount of serosanguineous bloody material released.  The cyst was probed and deloculated with small tweezers.  Patient tolerated procedure well without any complication.  Following procedure area was cleaned, covered with triple antibiotic ointment and gauze.  Wound care was reviewed with patient to include washing with soap and water daily, antibiotic ointment and keeping covered.  Continue with warm compresses or warm soaks as well as complete doxycycline.  If cyst continues or reoccurs in the future would recommend further evaluation.  Patient is already established with gynecologist outside of network and she was encouraged to be sure to follow-up with them if any further concerns and also ensure up-to-date with annual exam and Pap smear.    Chief Complaint   Patient presents with    Bartholin's Cyst     Pt c/o bartholin cyst, pt was [facility]  3 days ago placed on Doxycycline, Pt states very slight improvement, she also states her sx's have been going on for 10 days        Subjective:     Patient ID: Zora Stuart is a 58 y.o. female.    57y/o F here today for f/u vulvar cyst, dx bartholin cyst and given doxycycline.   [facility] - no procedure done.  States taking doxy x 3 days now with  only minimal improvement.  Also doing warm compresses.  States poked with a needle and only blood came out.  No fevers.   No pus or drainage from area, no fevers or chills.    No prior hx of vulvar cysts.         Review of Systems   Constitutional: Negative.  Negative for chills and fever.   Genitourinary:         Vulvar cyst         The following portions of the patient's history were reviewed and updated as appropriate: allergies, current medications, past family history, past medical history, past social history, past surgical history, and problem list.      Objective:     Physical Exam  Vitals reviewed.   Constitutional:       Appearance: Normal appearance. She is not ill-appearing.   Genitourinary:         Comments: Large left labial cyst, firm, mildly erythematous, tender to palpation.   Neurological:      Mental Status: She is alert and oriented to person, place, and time.   Psychiatric:         Mood and Affect: Mood normal.         Behavior: Behavior normal. Behavior is cooperative.         Incision and drain    Date/Time: 5/31/2024 8:30 AM    Performed by: Shelly Gan PA-C  Authorized by: Shelly Gan PA-C  Universal Protocol:  Procedure performed by: ([email or web address] and EMMANUEL welsh)  Consent: Verbal consent obtained.  Risks and benefits: risks, benefits and alternatives were discussed  Consent given by: patient  Patient understanding: patient states understanding of the procedure being performed  Patient consent: the patient's understanding of the procedure matches consent given    Patient location:  Clinic  Location:     Type:  Cyst    Size:  2.5cm    Location:  Anogenital    Anogenital location:  Vulva  Pre-procedure details:     Skin preparation:  Betadine  Anesthesia (see MAR for exact dosages):     Anesthesia method:  Local infiltration    Local anesthetic:  Lidocaine 1% WITH epi  Procedure details:     Complexity:  Simple    Needle aspiration: no      Incision types:  Stab incision    Scalpel  blade:  11    Approach:  Open    Incision depth:  Subcutaneous    Wound management:  Probed and deloculated    Drainage:  Serosanguinous    Drainage amount:  Moderate    Wound treatment:  Wound left open    Packing materials:  None  Post-procedure details:     Patient tolerance of procedure:  Tolerated well, no immediate complications